# Patient Record
Sex: MALE | Race: WHITE
[De-identification: names, ages, dates, MRNs, and addresses within clinical notes are randomized per-mention and may not be internally consistent; named-entity substitution may affect disease eponyms.]

---

## 2020-08-26 NOTE — PATH
[facility]
[address]
[place]   [address]                     PATHOLOGY RPT PROCEDURE       
_______________________________________________________________________________
 
Name:       LASHAE DAMON                Room #:                     DEP CAROL 
M.R.#:      [id number]     Account #:      [id number]  
Admission:  08/25/20    Date of Birth:  [birth year]  
Discharge:  08/25/20                                    Report #:    [id number]
                                                        Path Case #: [id number]
_______________________________________________________________________________
Note
LCA Accession Number: [id number]
   TESTS               RESULT  FLAG  UNITS    REF RANGE  LAB
------------------------------------------------------------
   Clinician Provided Cytology Information
   No. of containers..01 Other (Miscellaneous)
Source:                                                   01
   BAL
DIAGNOSIS:                                                02
   BAL
   NEGATIVE FOR MALIGNANT CELLS.
   REACTIVE BRONCHIAL CELLS ARE PRESENT.
   PULMONARY MACROPHAGES (DUST CELLS) ARE PRESENT.
   REACTIVE SQUAMOUS CELLS ARE PRESENT.
Signed out by:                                            02
   Kumar Paredes MD, Pathologist
   NPI- [id number]
Performed by:                                             01
   Sary Trinh, Cytotechnologist ([facility])
Gross description:                                        01
   25ML, PALE PINK, 1 TP
   /LCS  08/25/2020  1908 Local
 
------------------------------------------------------------
    FLAG LEGEND:
    L-Low Normal,H-High Normal,LL-Alert Low,HH-Alert High
    <-Panic Low,>-Panic High,A-Abnormal,AA-Critical Abnormal
------------------------------------------------------------
 
Performed at:
01 [facility]
   [address] [place] [address] [address]
   [place]  [address]
   Thierno Reynolds MD, Phone: [phone]
[address] [place] [address]  [address]
   Spencer Kerley MD, Phone: [phone]
Specimen Comment: A courtesy copy of this report has been sent to [phone]
Specimen Comment: Report sent to 
***Performed at:  01
   [facility]
   [address] [place] [address] [address], [place], [place]  [id number]
   MD Thierno Reynolds MD Phone:  [phone]

## 2020-08-26 NOTE — PATH
[facility]
[address]
[place]   [address]                     PATHOLOGY RPT PROCEDURE       
_______________________________________________________________________________
 
Name:       LASHAE DAMON                Room #:                     DEP CAROL 
M.R.#:      [id number]     Account #:      [id number]  
Admission:  08/25/20    Date of Birth:  [birth year]  
Discharge:  08/25/20                                    Report #:    [id number]
                                                        Path Case #: [id number]
_______________________________________________________________________________
Note
LCA Accession Number: [id number]
   TESTS               RESULT  FLAG  UNITS    REF RANGE  LAB
------------------------------------------------------------
   Clinician Provided Cytology Information
   No. of containers..01 Slide
Source:                                                   01
   RIGHT HILAR #2
DIAGNOSIS:                                                02
   RIGHT HILAR #2
   NEGATIVE FOR MALIGNANT CELLS.
   REACTIVE BRONCHIAL CELLS ARE PRESENT.
   PULMONARY MACROPHAGES (DUST CELLS) ARE PRESENT.
   COMMENT,
   MOSTLY REACTIVE BRONCHIAL CELLS ARE PRESENT. NOT MANY LYMPHOCYTES PRESENT.
   SUGGEST CLINICAL CORRELATION.
Signed out by:                                            02
   Kumar Paredes MD, Pathologist
   NPI- [id number]
Performed by:                                             01
   Sary Trinh, Cytotechnologist ([facility])
Gross description:                                        01
   2 FIXED
   /LCS  08/25/2020  1903 Local
 
------------------------------------------------------------
    FLAG LEGEND:
    L-Low Normal,H-High Normal,LL-Alert Low,HH-Alert High
    <-Panic Low,>-Panic High,A-Abnormal,AA-Critical Abnormal
------------------------------------------------------------
 
Performed at:
01 [address]  [address]
   Thierno Reynolds MD, Phone: [phone]
[address] [place] [address]  [address]
   Spencer Kerley MD, Phone: [phone]
Specimen Comment: A courtesy copy of this report has been sent to [phone]
Specimen Comment: Report sent to 
***Performed at:  01
   [address], [place], [place]  [id number]
   MD Thierno Reynolds MD Phone:  [phone]

## 2020-08-26 NOTE — PATH
[facility]
[address]
[place]   [address]                     PATHOLOGY RPT PROCEDURE       
_______________________________________________________________________________
 
Name:       LASHAE DAMON                Room #:                     DEP CAROL 
M.R.#:      [id number]     Account #:      [id number]  
Admission:  08/25/20    Date of Birth:  [birth year]  
Discharge:  08/25/20                                    Report #:    [id number]
                                                        Path Case #: [id number]
_______________________________________________________________________________
Note
LCA Accession Number: [id number]
   TESTS               RESULT  FLAG  UNITS    REF RANGE  LAB
------------------------------------------------------------
   Clinician Provided Cytology Information
   No. of containers..01 Slide
Source:                                                   01
   TBNA RIGHT HILAR
DIAGNOSIS:                                                02
   TBNA RIGHT HILAR
   NEGATIVE FOR MALIGNANT CELLS.
   REACTIVE BRONCHIAL CELLS ARE PRESENT.
   SCANT CELLULARITY.
   COMMENT,
   ONLY FEW LYMPHOCYTES PRESENT. MAY NOT BE REPRESENTATIVE
Signed out by:                                            02
   Kumar Paredes MD, Pathologist
   NPI- [id number]
Performed by:                                             01
   Sary Trinh, Cytotechnologist ([facility])
Gross description:                                        01
   4 FIXED
   /LCS  08/25/2020  1901 Local
 
------------------------------------------------------------
    FLAG LEGEND:
    L-Low Normal,H-High Normal,LL-Alert Low,HH-Alert High
    <-Panic Low,>-Panic High,A-Abnormal,AA-Critical Abnormal
------------------------------------------------------------
 
Performed at:
01 [address]
   [place]  [address]
   Thierno Reynolds MD, Phone: [phone]
[address] [place] [address]  [address]
   Spencer Kerley MD, Phone: [phone]
***Performed at:  01
   [address]  [id number]
   MD Thierno Reynolds MD Phone:  [phone]

## 2020-08-26 NOTE — PATH
[facility]
[address]
[place]   [address]                     PATHOLOGY RPT PROCEDURE       
_______________________________________________________________________________
 
Name:       LASHAE DAMON                Room #:                     DEP CAROL GARG.#:      [id number]     Account #:      [id number]  
Admission:  08/25/20    Date of Birth:  [birth year]  
Discharge:  08/25/20                                    Report #:    [id number]
                                                        Path Case #: [id number]
_______________________________________________________________________________
Note
LCA Accession Number: [id number]
   TESTS               RESULT  FLAG  UNITS    REF RANGE  LAB
------------------------------------------------------------
   Clinician Provided Cytology Information
   No. of containers..01 Slide
Source:                                                   01
   BR BRUSH TIP R HILAR
DIAGNOSIS:                                                02
   BR BRUSH TIP R HILAR
   NEGATIVE FOR MALIGNANT CELLS.
   REACTIVE BRONCHIAL CELLS ARE PRESENT.
   PULMONARY MACROPHAGES (DUST CELLS) ARE PRESENT.
   COMMENT,
   MOSTLY REACTIVE BRONCHIAL CELLS ARE PRESENT.
   LYMPHOCYTES NOT PRESENT.
   MAY NOT BE REPRESENTATIVE.
Signed out by:                                            02
   Kumar Paredes MD, Pathologist
   NPI- [id number]
Performed by:                                             01
   Sary Trinh, Cytotechnologist ([facility])
Gross description:                                        01
   1 TP
   /MICHAEL  08/25/2020  1906 Local
 
------------------------------------------------------------
    FLAG LEGEND:
    L-Low Normal,H-High Normal,LL-Alert Low,HH-Alert High
    <-Panic Low,>-Panic High,A-Abnormal,AA-Critical Abnormal
------------------------------------------------------------
 
Performed at:
01 [address]
   [place]  [address]
   Thierno Reynolds MD, Phone: [phone]
[address] [place] [address]  [address]
   Spencer Kerley MD, Phone: [phone]
Specimen Comment: A courtesy copy of this report has been sent to [phone]
Specimen Comment: Report sent to 
***Performed at:  01
   [address], [place], [place]  [id number]
   MD Thierno Reynolds MD Phone:  [phone]

## 2020-08-27 NOTE — PATH
[facility]
[address]
[place]   [address]                     PATHOLOGY RPT PROCEDURE       
_______________________________________________________________________________
 
Name:       LASHAE DAMON                Room #:                     DEP CAROL BURCIAGA.DONOVAN.#:      [id number]     Account #:      [id number]  
Admission:  08/25/20    Date of Birth:  [birth year]  
Discharge:  08/25/20                                    Report #:    [id number]
                                                        Path Case #: [id number]
_______________________________________________________________________________
Note
LCA Accession Number: [id number]
   TESTS               RESULT  FLAG  UNITS    REF RANGE  LAB
------------------------------------------------------------
   Clinician Provided Cytology Information
   No. of containers..01 Other (Miscellaneous)
Source:                                                   01
   TBNA ASP R HILAR
DIAGNOSIS:                                                02
   TBNA ASP R HILAR
   NEGATIVE FOR MALIGNANT CELLS.
   REACTIVE BRONCHIAL CELLS ARE PRESENT.
   COMMENT,
   LYMHOCYTES ARE NOT PRESENT.
   MAY NOT BE REPRESENTATIVE.
Signed out by:                                            02
   Kumar Paredes MD, Pathologist
   NPI- [id number]
Performed by:                                             01
   Sary Trinh, Cytotechnologist ([facility])
Gross description:                                        01
   25ML, CLOUDY, 1 CB
   /MICHAEL  08/25/2020  1915 Local
 
------------------------------------------------------------
    FLAG LEGEND:
    L-Low Normal,H-High Normal,LL-Alert Low,HH-Alert High
    <-Panic Low,>-Panic High,A-Abnormal,AA-Critical Abnormal
------------------------------------------------------------
 
Performed at:
[address]
   [place]  [address]
   Thierno Reynolds MD, Phone: [phone]
[address] [place] [address]  [address]
   Spencer Kerley MD, Phone: [phone]
Specimen Comment: A courtesy copy of this report has been sent to [phone]
Specimen Comment: Report sent to 
***Performed at:  01
   [address], [place], [place]  [id number]
   MD Thierno Reynolds MD Phone:  [phone]

## 2020-10-30 ENCOUNTER — HOSPITAL ENCOUNTER (OUTPATIENT)
Dept: HOSPITAL 35 - LAB | Age: 65
End: 2020-10-30
Attending: INTERNAL MEDICINE
Payer: COMMERCIAL

## 2020-10-30 DIAGNOSIS — K80.80: ICD-10-CM

## 2020-10-30 DIAGNOSIS — R91.8: Primary | ICD-10-CM

## 2020-10-30 DIAGNOSIS — K76.89: ICD-10-CM

## 2020-10-30 DIAGNOSIS — I25.10: ICD-10-CM

## 2020-10-30 LAB
BUN SERPL-MCNC: 16 MG/DL (ref 7–18)
CREAT SERPL-MCNC: 1.1 MG/DL (ref 0.7–1.3)

## 2020-11-19 ENCOUNTER — HOSPITAL ENCOUNTER (OUTPATIENT)
Dept: HOSPITAL 35 - SJCVC | Age: 65
End: 2020-11-19
Attending: INTERNAL MEDICINE
Payer: COMMERCIAL

## 2020-11-19 DIAGNOSIS — R59.0: ICD-10-CM

## 2020-11-19 DIAGNOSIS — E78.00: ICD-10-CM

## 2020-11-19 DIAGNOSIS — Z82.49: ICD-10-CM

## 2020-11-19 DIAGNOSIS — R93.1: ICD-10-CM

## 2020-11-19 DIAGNOSIS — R94.31: Primary | ICD-10-CM

## 2020-11-19 DIAGNOSIS — I10: ICD-10-CM

## 2021-05-19 ENCOUNTER — HOSPITAL ENCOUNTER (OUTPATIENT)
Dept: HOSPITAL 35 - CAT | Age: 66
End: 2021-05-19
Attending: INTERNAL MEDICINE
Payer: COMMERCIAL

## 2021-05-19 DIAGNOSIS — R91.8: Primary | ICD-10-CM

## 2021-09-20 ENCOUNTER — HOSPITAL ENCOUNTER (OUTPATIENT)
Dept: HOSPITAL 35 - SJCVC | Age: 66
End: 2021-09-20
Attending: INTERNAL MEDICINE
Payer: COMMERCIAL

## 2021-09-20 DIAGNOSIS — R94.31: Primary | ICD-10-CM

## 2021-09-20 DIAGNOSIS — Z87.891: ICD-10-CM

## 2021-09-20 DIAGNOSIS — R91.8: ICD-10-CM

## 2021-09-20 DIAGNOSIS — R93.1: ICD-10-CM

## 2021-09-20 DIAGNOSIS — Z72.89: ICD-10-CM

## 2021-09-20 DIAGNOSIS — E78.00: ICD-10-CM

## 2021-09-20 DIAGNOSIS — Z88.0: ICD-10-CM

## 2021-09-20 DIAGNOSIS — Z82.49: ICD-10-CM

## 2021-09-20 DIAGNOSIS — I10: ICD-10-CM

## 2022-01-27 ENCOUNTER — HOSPITAL ENCOUNTER (OUTPATIENT)
Dept: HOSPITAL 35 - CAT | Age: 67
End: 2022-01-27
Attending: INTERNAL MEDICINE
Payer: COMMERCIAL

## 2022-01-27 DIAGNOSIS — I70.0: ICD-10-CM

## 2022-01-27 DIAGNOSIS — M25.78: ICD-10-CM

## 2022-01-27 DIAGNOSIS — Z12.2: Primary | ICD-10-CM

## 2022-01-27 DIAGNOSIS — R91.8: ICD-10-CM
